# Patient Record
Sex: FEMALE | Race: WHITE | URBAN - NONMETROPOLITAN AREA
[De-identification: names, ages, dates, MRNs, and addresses within clinical notes are randomized per-mention and may not be internally consistent; named-entity substitution may affect disease eponyms.]

---

## 2020-11-19 ENCOUNTER — OFFICE VISIT (OUTPATIENT)
Dept: URBAN - NONMETROPOLITAN AREA CLINIC 4 | Facility: CLINIC | Age: 50
End: 2020-11-19
Payer: COMMERCIAL

## 2020-11-19 ENCOUNTER — DASHBOARD ENCOUNTERS (OUTPATIENT)
Age: 50
End: 2020-11-19

## 2020-11-19 DIAGNOSIS — R10.11 RUQ ABDOMINAL PAIN: ICD-10-CM

## 2020-11-19 PROCEDURE — 99244 OFF/OP CNSLTJ NEW/EST MOD 40: CPT | Performed by: INTERNAL MEDICINE

## 2020-11-19 PROCEDURE — G8482 FLU IMMUNIZE ORDER/ADMIN: HCPCS | Performed by: INTERNAL MEDICINE

## 2020-11-19 RX ORDER — MULTIVITAMIN
1 TABLET TABLET ORAL ONCE A DAY
Status: ACTIVE | COMMUNITY

## 2020-11-19 RX ORDER — MULTIVIT-MIN/IRON/FOLIC ACID/K 18-600-40
1 CAPSULE CAPSULE ORAL ONCE A DAY
Status: ACTIVE | COMMUNITY

## 2020-11-19 RX ORDER — DICYCLOMINE HYDROCHLORIDE 20 MG/1
TAKE 1 TABLET BY MOUTH THREE TIMES DAILY AS NEEDED FOR 30 DAYS TABLET ORAL THREE TIMES A DAY
Qty: 90 | Refills: 2 | OUTPATIENT
Start: 2020-11-19 | End: 2021-08-16

## 2020-11-19 NOTE — HPI-TODAY'S VISIT:
Pt report that she had covid in October and had GI symptoms.  Pt reports that she began to have RUQ pain post covid.  Pt reports that she use to be a vegetarian and began to eat grass fed beef.  pt reports that she was hurting anyway and states that she had an amylase and lipase. Hgb wass 14.8 and plt of 195.  Pt reportss that she had strep infection last year s/p egd and c scope.  Pt reports that she had autonomic dysfunction.  Pt with lipase slightly elevated lipase. Pt with neg damon.  Pt reports that she has been doing a low fat diet.  Lipase was 75.

## 2021-01-19 ENCOUNTER — OFFICE VISIT (OUTPATIENT)
Dept: URBAN - NONMETROPOLITAN AREA CLINIC 4 | Facility: CLINIC | Age: 51
End: 2021-01-19

## 2021-02-11 ENCOUNTER — OFFICE VISIT (OUTPATIENT)
Dept: URBAN - NONMETROPOLITAN AREA CLINIC 4 | Facility: CLINIC | Age: 51
End: 2021-02-11

## 2021-03-11 ENCOUNTER — OFFICE VISIT (OUTPATIENT)
Dept: URBAN - NONMETROPOLITAN AREA CLINIC 4 | Facility: CLINIC | Age: 51
End: 2021-03-11

## 2022-10-24 NOTE — PHYSICAL EXAM CARDIOVASCULAR:
Angel Sanchez's prescription of lisinopril-HCTZ, Mioxidil, Verapamil, K+, Lasix is approved for a 90 day refill.  LOV 11/05/18  Last filled varied dates  Last /68    This refill has been completed per the [facility] Refill Policy.      Writer is unable to authorize the prescription of Lorazapam due to the [facility] Refill Policy's List of approved medication/class and the last refill was more than 1 year ago (10/17/17)    Will forward to PMD    
no edema, no murmurs, regular rate and rhythm
verbalization